# Patient Record
Sex: FEMALE | Race: WHITE | NOT HISPANIC OR LATINO
[De-identification: names, ages, dates, MRNs, and addresses within clinical notes are randomized per-mention and may not be internally consistent; named-entity substitution may affect disease eponyms.]

---

## 2019-05-07 PROBLEM — Z00.00 ENCOUNTER FOR PREVENTIVE HEALTH EXAMINATION: Status: ACTIVE | Noted: 2019-05-07

## 2019-07-22 ENCOUNTER — APPOINTMENT (OUTPATIENT)
Dept: OBGYN | Facility: CLINIC | Age: 74
End: 2019-07-22
Payer: MEDICARE

## 2019-07-22 VITALS
WEIGHT: 180 LBS | HEIGHT: 65 IN | DIASTOLIC BLOOD PRESSURE: 90 MMHG | BODY MASS INDEX: 29.99 KG/M2 | SYSTOLIC BLOOD PRESSURE: 150 MMHG

## 2019-07-22 DIAGNOSIS — Z80.3 FAMILY HISTORY OF MALIGNANT NEOPLASM OF BREAST: ICD-10-CM

## 2019-07-22 DIAGNOSIS — Z86.19 PERSONAL HISTORY OF OTHER INFECTIOUS AND PARASITIC DISEASES: ICD-10-CM

## 2019-07-22 DIAGNOSIS — Z87.898 PERSONAL HISTORY OF OTHER SPECIFIED CONDITIONS: ICD-10-CM

## 2019-07-22 PROCEDURE — 99214 OFFICE O/P EST MOD 30 MIN: CPT

## 2019-07-22 NOTE — PHYSICAL EXAM
[Awake] : awake [Alert] : alert [Acute Distress] : no acute distress [LAD] : no lymphadenopathy [Goiter] : no goiter [Thyroid Nodule] : no thyroid nodule [Mass] : no breast mass [Nipple Discharge] : no nipple discharge [Axillary LAD] : no axillary lymphadenopathy [Soft] : soft [Tender] : non tender [Distended] : not distended [H/Smegaly] : no hepatosplenomegaly [Oriented x3] : oriented to person, place, and time [Flat Affect] : affect not flat [Depressed Mood] : not depressed [Normal] : uterus [Atrophy] : atrophy [No Bleeding] : there was no active vaginal bleeding [Uterine Adnexae] : were not tender and not enlarged [FreeTextEntry5] : flush with end of vagina. band of scar tissue over cervix. No change from last exam.

## 2019-07-22 NOTE — CHIEF COMPLAINT
[Annual Visit] : annual visit [FreeTextEntry1] : 72 yo G 4 P 4004 with LMP 50s presents for annual from my prior office.

## 2019-07-23 ENCOUNTER — MOBILE ON CALL (OUTPATIENT)
Age: 74
End: 2019-07-23

## 2019-07-23 LAB — HPV HIGH+LOW RISK DNA PNL CVX: NOT DETECTED

## 2020-06-21 ENCOUNTER — EMERGENCY (EMERGENCY)
Facility: HOSPITAL | Age: 75
LOS: 0 days | Discharge: HOME | End: 2020-06-21
Attending: EMERGENCY MEDICINE
Payer: MEDICARE

## 2020-06-21 VITALS
TEMPERATURE: 98 F | SYSTOLIC BLOOD PRESSURE: 145 MMHG | RESPIRATION RATE: 18 BRPM | HEART RATE: 78 BPM | HEIGHT: 64 IN | WEIGHT: 169.98 LBS | OXYGEN SATURATION: 96 % | DIASTOLIC BLOOD PRESSURE: 86 MMHG

## 2020-06-21 DIAGNOSIS — R60.9 EDEMA, UNSPECIFIED: ICD-10-CM

## 2020-06-21 DIAGNOSIS — M79.89 OTHER SPECIFIED SOFT TISSUE DISORDERS: ICD-10-CM

## 2020-06-21 PROCEDURE — 93971 EXTREMITY STUDY: CPT | Mod: 26,LT

## 2020-06-21 PROCEDURE — 93971 EXTREMITY STUDY: CPT | Mod: LT

## 2020-06-21 PROCEDURE — 99284 EMERGENCY DEPT VISIT MOD MDM: CPT

## 2020-06-21 PROCEDURE — 99284 EMERGENCY DEPT VISIT MOD MDM: CPT | Mod: 25

## 2020-06-21 NOTE — ED STATDOCS - SKIN, MLM
skin normal color for race, warm, dry and intact. +LLE with mild swelling, no erythema. 2+ DP pulses

## 2020-06-21 NOTE — ED STATDOCS - PROGRESS NOTE DETAILS
75 y/o F with PMHx of Arthritis presenting to the ED c/o LLE swelling and redness. Patient reports that she has been having LLE numbness, tingling that she attributed to sciatica, but yesterday she started having increased redness, swelling to LLE,. Patient came to the ED to r/o phlebitis. No hx of DVT. Notes that she had fall x1 week ago. States that redness, swelling has improved since yesterday. Denies CP, SOB, cough, fever or chills.   Laura Freraro PA-C Will US r/o DVT.  MIld STS to LLE.  Laura Ferraro PA-C US neg for DVT.  Pt. to follow with PMD and have outpatient repeat US one week.  Return to ED if any worsening symptoms.  Pt. understand instructions provided to her.  Laura Ferraro PA-C

## 2020-06-21 NOTE — ED STATDOCS - CARE PROVIDER_API CALL
Demi Fernandez E  INTERNAL MEDICINE  [address] [place] [address]  Phone: [phone]  Fax: [phone]  Follow Up Time:

## 2020-06-21 NOTE — ED STATDOCS - PATIENT PORTAL LINK FT
You can access the FollowMyHealth Patient Portal offered by [facility] by registering at the following website: [email or web address]. By joining [facility]’s FollowMyHealth portal, you will also be able to view your health information using other applications (apps) compatible with our system.

## 2020-06-21 NOTE — ED ADULT TRIAGE NOTE - CHIEF COMPLAINT QUOTE
Pt comes to the ED for left lower extremity swelling and redness. Pt states that it has been going on for a while and she initially thought it was sciatica, but today it has gotten much worse and swelling and redness began.

## 2020-06-21 NOTE — ED STATDOCS - OBJECTIVE STATEMENT
75 y/o F with PMHx of Arthritis presenting to the ED c/o LLE swelling and redness. Patient reports that she has been having LLE numbness, tingling that she attributed to sciatica, but yesterday she started having increased redness, swelling to LLE,. Patient came to the ED to r/o phlebitis. No hx of DVT. Notes that she had fall x1 week ago. States that redness, swelling has improved since yesterday. Denies CP, SOB, cough, fever or chills.

## 2020-06-21 NOTE — ED STATDOCS - CLINICAL SUMMARY MEDICAL DECISION MAKING FREE TEXT BOX
Will evaluate for DVT vs phlebitis. No signs of cellulitis, dispo pending imaging, reassessment. Will evaluate for DVT vs phlebitis. No signs of cellulitis, dispo pending imaging, reassessment.    US neg for DVT.  Pt. to follow with PMD and have outpatient repeat US one week.  Return to ED if any worsening symptoms.  Pt. understand instructions provided to her.  Laura Ferraro PA-C Will evaluate for DVT vs phlebitis. No signs of cellulitis, dispo pending imaging, reassessment.  No significant edema to LLE.      US neg for DVT.  Pt. to follow with PMD and have outpatient repeat US one week.  Return to ED if any worsening symptoms.  Pt. understand instructions provided to her.  Laura Ferraro PA-C

## 2021-02-23 PROBLEM — Z78.9 OTHER SPECIFIED HEALTH STATUS: Chronic | Status: ACTIVE | Noted: 2020-06-21

## 2021-03-25 ENCOUNTER — APPOINTMENT (OUTPATIENT)
Dept: OBGYN | Facility: CLINIC | Age: 76
End: 2021-03-25
Payer: MEDICARE

## 2021-03-25 VITALS
HEIGHT: 64 IN | SYSTOLIC BLOOD PRESSURE: 114 MMHG | BODY MASS INDEX: 29.88 KG/M2 | WEIGHT: 175 LBS | DIASTOLIC BLOOD PRESSURE: 70 MMHG

## 2021-03-25 DIAGNOSIS — Z86.19 PERSONAL HISTORY OF OTHER INFECTIOUS AND PARASITIC DISEASES: ICD-10-CM

## 2021-03-25 DIAGNOSIS — Z87.39 PERSONAL HISTORY OF OTHER DISEASES OF THE MUSCULOSKELETAL SYSTEM AND CONNECTIVE TISSUE: ICD-10-CM

## 2021-03-25 PROCEDURE — G0101: CPT

## 2021-03-25 RX ORDER — DICLOFENAC SODIUM 75 MG/1
75 TABLET, DELAYED RELEASE ORAL
Refills: 0 | Status: ACTIVE | COMMUNITY

## 2021-03-25 NOTE — HISTORY OF PRESENT ILLNESS
[FreeTextEntry1] : 72 yo  with LMP early 50s for annual.\par \par LEEP about  with normal paps.  HPV also neg.\par \par  x 4 7-9#\par LISY requires pad daily but not bothered.\par \par Daughter with breast cancer, but negative BRCA gene.\par M Aunt in 60s [Mammogramdate] : 2020 [PapSmeardate] : 2019 [ColonoscopyDate] : 2020 [TextBox_43] : Aly Fernandez

## 2021-03-25 NOTE — DISCUSSION/SUMMARY
[FreeTextEntry1] : Health Maintenance:\par -Pap with HPV neg in 2019\par -Mammo Rx\par -TBSE\par -colonoscopy guidelines reviewed with patient\par -Achieve Vit D levels of 30-40, intake of 1100 mg daily calcium mostly thru dark green\par leafy greens and milk products, exercise 30 minutes TIW\par \par LEEP\par 2012\par repeat pap today

## 2021-03-29 ENCOUNTER — NON-APPOINTMENT (OUTPATIENT)
Age: 76
End: 2021-03-29

## 2021-04-12 ENCOUNTER — OUTPATIENT (OUTPATIENT)
Dept: OUTPATIENT SERVICES | Facility: HOSPITAL | Age: 76
LOS: 1 days | End: 2021-04-12
Payer: MEDICARE

## 2021-04-12 ENCOUNTER — APPOINTMENT (OUTPATIENT)
Dept: RADIOLOGY | Facility: CLINIC | Age: 76
End: 2021-04-12
Payer: MEDICARE

## 2021-04-12 ENCOUNTER — RESULT REVIEW (OUTPATIENT)
Age: 76
End: 2021-04-12

## 2021-04-12 ENCOUNTER — APPOINTMENT (OUTPATIENT)
Dept: MAMMOGRAPHY | Facility: CLINIC | Age: 76
End: 2021-04-12
Payer: MEDICARE

## 2021-04-12 ENCOUNTER — TRANSCRIPTION ENCOUNTER (OUTPATIENT)
Age: 76
End: 2021-04-12

## 2021-04-12 DIAGNOSIS — Z12.31 ENCOUNTER FOR SCREENING MAMMOGRAM FOR MALIGNANT NEOPLASM OF BREAST: ICD-10-CM

## 2021-04-12 PROCEDURE — 77067 SCR MAMMO BI INCL CAD: CPT | Mod: 26

## 2021-04-12 PROCEDURE — 77067 SCR MAMMO BI INCL CAD: CPT

## 2021-04-12 PROCEDURE — 77063 BREAST TOMOSYNTHESIS BI: CPT

## 2021-04-12 PROCEDURE — 77063 BREAST TOMOSYNTHESIS BI: CPT | Mod: 26

## 2021-09-14 ENCOUNTER — EMERGENCY (EMERGENCY)
Facility: HOSPITAL | Age: 76
LOS: 0 days | Discharge: HOME | End: 2021-09-14
Attending: STUDENT IN AN ORGANIZED HEALTH CARE EDUCATION/TRAINING PROGRAM
Payer: MEDICARE

## 2021-09-14 VITALS
HEIGHT: 64 IN | RESPIRATION RATE: 17 BRPM | WEIGHT: 160.06 LBS | SYSTOLIC BLOOD PRESSURE: 136 MMHG | TEMPERATURE: 98 F | DIASTOLIC BLOOD PRESSURE: 68 MMHG | HEART RATE: 79 BPM | OXYGEN SATURATION: 100 %

## 2021-09-14 VITALS
OXYGEN SATURATION: 100 % | SYSTOLIC BLOOD PRESSURE: 138 MMHG | HEART RATE: 71 BPM | DIASTOLIC BLOOD PRESSURE: 67 MMHG | RESPIRATION RATE: 20 BRPM

## 2021-09-14 DIAGNOSIS — R11.2 NAUSEA WITH VOMITING, UNSPECIFIED: ICD-10-CM

## 2021-09-14 DIAGNOSIS — K92.1 MELENA: ICD-10-CM

## 2021-09-14 DIAGNOSIS — K52.9 NONINFECTIVE GASTROENTERITIS AND COLITIS, UNSPECIFIED: ICD-10-CM

## 2021-09-14 DIAGNOSIS — R19.7 DIARRHEA, UNSPECIFIED: ICD-10-CM

## 2021-09-14 DIAGNOSIS — R10.9 UNSPECIFIED ABDOMINAL PAIN: ICD-10-CM

## 2021-09-14 LAB
ALBUMIN SERPL ELPH-MCNC: 3.6 G/DL — SIGNIFICANT CHANGE UP (ref 3.3–5)
ALP SERPL-CCNC: 80 U/L — SIGNIFICANT CHANGE UP (ref 40–120)
ALT FLD-CCNC: 23 U/L — SIGNIFICANT CHANGE UP (ref 12–78)
ANION GAP SERPL CALC-SCNC: 3 MMOL/L — LOW (ref 5–17)
APPEARANCE UR: CLEAR — SIGNIFICANT CHANGE UP
AST SERPL-CCNC: 32 U/L — SIGNIFICANT CHANGE UP (ref 15–37)
BASOPHILS # BLD AUTO: 0.04 K/UL — SIGNIFICANT CHANGE UP (ref 0–0.2)
BASOPHILS NFR BLD AUTO: 0.4 % — SIGNIFICANT CHANGE UP (ref 0–2)
BILIRUB SERPL-MCNC: 0.5 MG/DL — SIGNIFICANT CHANGE UP (ref 0.2–1.2)
BILIRUB UR-MCNC: NEGATIVE — SIGNIFICANT CHANGE UP
BUN SERPL-MCNC: 21 MG/DL — SIGNIFICANT CHANGE UP (ref 7–23)
CALCIUM SERPL-MCNC: 8.8 MG/DL — SIGNIFICANT CHANGE UP (ref 8.5–10.1)
CHLORIDE SERPL-SCNC: 108 MMOL/L — SIGNIFICANT CHANGE UP (ref 96–108)
CO2 SERPL-SCNC: 30 MMOL/L — SIGNIFICANT CHANGE UP (ref 22–31)
COLOR SPEC: YELLOW — SIGNIFICANT CHANGE UP
CREAT SERPL-MCNC: 0.94 MG/DL — SIGNIFICANT CHANGE UP (ref 0.5–1.3)
DIFF PNL FLD: ABNORMAL
EOSINOPHIL # BLD AUTO: 0.55 K/UL — HIGH (ref 0–0.5)
EOSINOPHIL NFR BLD AUTO: 5.4 % — SIGNIFICANT CHANGE UP (ref 0–6)
GLUCOSE SERPL-MCNC: 78 MG/DL — SIGNIFICANT CHANGE UP (ref 70–99)
GLUCOSE UR QL: NEGATIVE MG/DL — SIGNIFICANT CHANGE UP
HCT VFR BLD CALC: 45.8 % — HIGH (ref 34.5–45)
HGB BLD-MCNC: 14.4 G/DL — SIGNIFICANT CHANGE UP (ref 11.5–15.5)
IMM GRANULOCYTES NFR BLD AUTO: 0.6 % — SIGNIFICANT CHANGE UP (ref 0–1.5)
KETONES UR-MCNC: ABNORMAL
LEUKOCYTE ESTERASE UR-ACNC: ABNORMAL
LIDOCAIN IGE QN: 129 U/L — SIGNIFICANT CHANGE UP (ref 73–393)
LYMPHOCYTES # BLD AUTO: 19.8 % — SIGNIFICANT CHANGE UP (ref 13–44)
LYMPHOCYTES # BLD AUTO: 2.01 K/UL — SIGNIFICANT CHANGE UP (ref 1–3.3)
MCHC RBC-ENTMCNC: 28.6 PG — SIGNIFICANT CHANGE UP (ref 27–34)
MCHC RBC-ENTMCNC: 31.4 GM/DL — LOW (ref 32–36)
MCV RBC AUTO: 91.1 FL — SIGNIFICANT CHANGE UP (ref 80–100)
MONOCYTES # BLD AUTO: 0.63 K/UL — SIGNIFICANT CHANGE UP (ref 0–0.9)
MONOCYTES NFR BLD AUTO: 6.2 % — SIGNIFICANT CHANGE UP (ref 2–14)
NEUTROPHILS # BLD AUTO: 6.88 K/UL — SIGNIFICANT CHANGE UP (ref 1.8–7.4)
NEUTROPHILS NFR BLD AUTO: 67.6 % — SIGNIFICANT CHANGE UP (ref 43–77)
NITRITE UR-MCNC: NEGATIVE — SIGNIFICANT CHANGE UP
PH UR: 5 — SIGNIFICANT CHANGE UP (ref 5–8)
PLATELET # BLD AUTO: 321 K/UL — SIGNIFICANT CHANGE UP (ref 150–400)
POTASSIUM SERPL-MCNC: 4.9 MMOL/L — SIGNIFICANT CHANGE UP (ref 3.5–5.3)
POTASSIUM SERPL-SCNC: 4.9 MMOL/L — SIGNIFICANT CHANGE UP (ref 3.5–5.3)
PROT SERPL-MCNC: 7.3 GM/DL — SIGNIFICANT CHANGE UP (ref 6–8.3)
PROT UR-MCNC: 15 MG/DL
RBC # BLD: 5.03 M/UL — SIGNIFICANT CHANGE UP (ref 3.8–5.2)
RBC # FLD: 14.5 % — SIGNIFICANT CHANGE UP (ref 10.3–14.5)
SODIUM SERPL-SCNC: 141 MMOL/L — SIGNIFICANT CHANGE UP (ref 135–145)
SP GR SPEC: 1.02 — SIGNIFICANT CHANGE UP (ref 1.01–1.02)
UROBILINOGEN FLD QL: NEGATIVE MG/DL — SIGNIFICANT CHANGE UP
WBC # BLD: 10.17 K/UL — SIGNIFICANT CHANGE UP (ref 3.8–10.5)
WBC # FLD AUTO: 10.17 K/UL — SIGNIFICANT CHANGE UP (ref 3.8–10.5)

## 2021-09-14 PROCEDURE — 96360 HYDRATION IV INFUSION INIT: CPT | Mod: XU

## 2021-09-14 PROCEDURE — 74177 CT ABD & PELVIS W/CONTRAST: CPT

## 2021-09-14 PROCEDURE — 81001 URINALYSIS AUTO W/SCOPE: CPT

## 2021-09-14 PROCEDURE — 74177 CT ABD & PELVIS W/CONTRAST: CPT | Mod: 26,MG

## 2021-09-14 PROCEDURE — 36415 COLL VENOUS BLD VENIPUNCTURE: CPT

## 2021-09-14 PROCEDURE — 85025 COMPLETE CBC W/AUTO DIFF WBC: CPT

## 2021-09-14 PROCEDURE — 83690 ASSAY OF LIPASE: CPT

## 2021-09-14 PROCEDURE — U0005: CPT

## 2021-09-14 PROCEDURE — U0003: CPT

## 2021-09-14 PROCEDURE — 99284 EMERGENCY DEPT VISIT MOD MDM: CPT

## 2021-09-14 PROCEDURE — G1004: CPT

## 2021-09-14 PROCEDURE — 80053 COMPREHEN METABOLIC PANEL: CPT

## 2021-09-14 PROCEDURE — 99284 EMERGENCY DEPT VISIT MOD MDM: CPT | Mod: 25

## 2021-09-14 PROCEDURE — 87086 URINE CULTURE/COLONY COUNT: CPT

## 2021-09-14 RX ORDER — METRONIDAZOLE 500 MG
1 TABLET ORAL
Qty: 21 | Refills: 0
Start: 2021-09-14 | End: 2021-09-20

## 2021-09-14 RX ORDER — METRONIDAZOLE 500 MG
500 TABLET ORAL ONCE
Refills: 0 | Status: COMPLETED | OUTPATIENT
Start: 2021-09-14 | End: 2021-09-14

## 2021-09-14 RX ORDER — MOXIFLOXACIN HYDROCHLORIDE TABLETS, 400 MG 400 MG/1
1 TABLET, FILM COATED ORAL
Qty: 14 | Refills: 0
Start: 2021-09-14 | End: 2021-09-20

## 2021-09-14 RX ORDER — CIPROFLOXACIN LACTATE 400MG/40ML
500 VIAL (ML) INTRAVENOUS ONCE
Refills: 0 | Status: COMPLETED | OUTPATIENT
Start: 2021-09-14 | End: 2021-09-14

## 2021-09-14 RX ORDER — SODIUM CHLORIDE 9 MG/ML
1000 INJECTION INTRAMUSCULAR; INTRAVENOUS; SUBCUTANEOUS ONCE
Refills: 0 | Status: COMPLETED | OUTPATIENT
Start: 2021-09-14 | End: 2021-09-14

## 2021-09-14 RX ADMIN — Medication 500 MILLIGRAM(S): at 10:35

## 2021-09-14 RX ADMIN — SODIUM CHLORIDE 1000 MILLILITER(S): 9 INJECTION INTRAMUSCULAR; INTRAVENOUS; SUBCUTANEOUS at 07:49

## 2021-09-14 RX ADMIN — SODIUM CHLORIDE 1000 MILLILITER(S): 9 INJECTION INTRAMUSCULAR; INTRAVENOUS; SUBCUTANEOUS at 08:49

## 2021-09-14 NOTE — ED ADULT NURSE NOTE - DISCHARGE DATE/TIME
Normal vision: sees adequately in most situations; can see medication labels, newsprint 14-Sep-2021 10:43

## 2021-09-14 NOTE — ED ADULT TRIAGE NOTE - CHIEF COMPLAINT QUOTE
Pt presents to the ED c/o lower abdominal pain. Pt states pain started Saturday night with associated N/V/D. Reports "bloody mucus." Yesterday pain subsided however came back this morning and woke her up. No episodes of N/V/D today, but came to rule out an obstruction. Denies chest pain, SOB, dizziness, fevers, dysuria.

## 2021-09-14 NOTE — ED PROVIDER NOTE - PATIENT PORTAL LINK FT
You can access the FollowMyHealth Patient Portal offered by [facility] by registering at the following website: [email or web address]. By joining [facility]’s FollowMyHealth portal, you will also be able to view your health information using other applications (apps) compatible with our system.

## 2021-09-14 NOTE — ED PROVIDER NOTE - CLINICAL SUMMARY MEDICAL DECISION MAKING FREE TEXT BOX
Labs, CT, urine. 75 y/o female with abdominal pain. Get Labs, CT, urine. 75 y/o female with abdominal pain. labs, ct, urine

## 2021-09-14 NOTE — ED PROVIDER NOTE - OBJECTIVE STATEMENT
75 y/o female presents to the ED c/o abdominal pain. Pt states she went out to eat on Saturday, states she had went to the bakery and had some old [place] that was sitting in the refrigerator. Pt states she then developed diffuse vomiting, diarrhea and blood in her stools. States she has also bee having intermittent abdominal cramping since then, which worsened this morning, prompting her ED visit. Denies nausea or vomiting at this time. Denies fevers or chills. No Hx of abdominal surgeries. 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

## 2021-09-14 NOTE — ED PROVIDER NOTE - MDM PATIENT STATEMENT FOR ADDL TREATMENT
appears normal and intact
Patient with one or more new problems requiring additional work-up/treatment.

## 2021-09-14 NOTE — ED PROVIDER NOTE - PROGRESS NOTE DETAILS
Angie DO: ct with concern for colitis; antibiotics ordered; rx sent; given copy of all labs, urine, ct scan results and notified of all results- need for non emergent pelvic and ruq ultrasound (no right upper quadrant tenderness) as outpatient; strict return precautions given.

## 2021-09-14 NOTE — ED ADULT TRIAGE NOTE - BMI (KG/M2)
[FreeTextEntry1] : I, Lesa Mccall ATC, assisted with the history and documentation for Dr. Umana on this date 02/21/2020.\par  27.5

## 2021-09-14 NOTE — ED ADULT NURSE NOTE - CHIEF COMPLAINT QUOTE
Preventing Migraine Headaches: Triggers     Red wine is a common migraine trigger. The first step in preventing migraines is to learn what triggers them. You may then be able to control your triggers to avoid or reduce the severity of your migraines. · Change your behavior at times when triggers can't be avoided. For example, make sure to get enough rest and drink plenty of water while you're traveling. Make sure to carry a hat, sunglasses, and your medicines.  Be alert for migraine symptoms, so you can · Drink only clear liquids or eat a light diet until your symptoms get better. This will help you avoid nausea and vomiting. How to prevent migraines  Pay attention to what seems to trigger your headache. Try to avoid the triggers when you can.  If you hav Follow-up care  Follow up with your healthcare provider, or as advised. Talk with your provider if you have frequent headaches. He or she can figure out a treatment plan. Ask if you can have medicine to take at home the next time you get a bad headache.  Gaby Camacho Pt presents to the ED c/o lower abdominal pain. Pt states pain started Saturday night with associated N/V/D. Reports "bloody mucus." Yesterday pain subsided however came back this morning and woke her up. No episodes of N/V/D today, but came to rule out an obstruction. Denies chest pain, SOB, dizziness, fevers, dysuria.

## 2021-09-14 NOTE — ED ADULT NURSE NOTE - OBJECTIVE STATEMENT
Pt is a 76 yr old female who presents to the Ed with c/o abdominal pain that started on Saturday.  Assocated nausea and diarrhea. Denies fever, sick contacts

## 2021-09-14 NOTE — ED PROVIDER NOTE - NSFOLLOWUPINSTRUCTIONS_ED_ALL_ED_FT
Colitis     Colitis is inflammation of the colon. Colitis may last a short time (be acute), or it may last a long time (become chronic).  What are the causes?  This condition may be caused by:  Viruses.Bacteria.Reaction to medicine.Certain autoimmune diseases such as Crohn's disease or ulcerative colitis.Radiation treatment.Decreased blood flow to the bowel (ischemia).What are the signs or symptoms?  Symptoms of this condition include:  Watery diarrhea.Passing bloody or tarry stool.Pain.Fever.Vomiting.Tiredness (fatigue).Weight loss.Bloating.Abdominal pain.Having fewer bowel movements than usual.A strong and sudden urge to have a bowel movement.Feeling like the bowel is not empty after a bowel movement.How is this diagnosed?  This condition is diagnosed with a stool test or a blood test.  You may also have other tests, such as:  X-rays.CT scan.Colonoscopy.Endoscopy.Biopsy.How is this treated?  Treatment for this condition depends on the cause. The condition may be treated by:  Resting the bowel. This involves not eating or drinking for a period of time.Fluids that are given through an IV.Medicine for pain and diarrhea.Antibiotic medicines.Cortisone medicines.Surgery.Follow these instructions at home:  Eating and drinking        Follow instructions from your health care provider about eating or drinking restrictions.Drink enough fluid to keep your urine pale yellow.Work with a dietitian to determine which foods cause your condition to flare up.Avoid foods that cause flare-ups.Eat a well-balanced diet.General instructions     If you were prescribed an antibiotic medicine, take it as told by your health care provider. Do not stop taking the antibiotic even if you start to feel better.Take over-the-counter and prescription medicines only as told by your health care provider.Keep all follow-up visits as told by your health care provider. This is important.Contact a health care provider if:  Your symptoms do not go away.You develop new symptoms.Get help right away if you:  Have a fever that does not go away with treatment.Develop chills.Have extreme weakness, fainting, or dehydration.Have repeated vomiting.Develop severe pain in your abdomen.Pass bloody or tarry stool.Summary  Colitis is inflammation of the colon. Colitis may last a short time (be acute), or it may last a long time (become chronic).Treatment for this condition depends on the cause and may include resting the bowel, taking medicines, or having surgery.If you were prescribed an antibiotic medicine, take it as told by your health care provider. Do not stop taking the antibiotic even if you start to feel better.Get help right away if you develop severe pain in your abdomen.Keep all follow-up visits as told by your health care provider. This is important.This information is not intended to replace advice given to you by your health care provider. Make sure you discuss any questions you have with your health care provider.

## 2021-09-14 NOTE — ED ADULT NURSE NOTE - NSIMPLEMENTINTERV_GEN_ALL_ED
Implemented All Universal Safety Interventions:  [place] to call system. Call bell, personal items and telephone within reach. Instruct patient to call for assistance. Room bathroom lighting operational. Non-slip footwear when patient is off stretcher. Physically safe environment: no spills, clutter or unnecessary equipment. Stretcher in lowest position, wheels locked, appropriate side rails in place.

## 2021-09-14 NOTE — ED PROVIDER NOTE - CARE PROVIDER_API CALL
Giovanni Perez)  Gastroenterology; Internal Medicine  [address] [place] [address]  Phone: [phone]  Fax: [phone]  Follow Up Time:

## 2021-09-15 LAB
CULTURE RESULTS: SIGNIFICANT CHANGE UP
SPECIMEN SOURCE: SIGNIFICANT CHANGE UP

## 2021-09-20 DIAGNOSIS — N95.0 POSTMENOPAUSAL BLEEDING: ICD-10-CM

## 2021-09-21 ENCOUNTER — APPOINTMENT (OUTPATIENT)
Dept: OBGYN | Facility: CLINIC | Age: 76
End: 2021-09-21

## 2021-09-23 ENCOUNTER — APPOINTMENT (OUTPATIENT)
Dept: OBGYN | Facility: CLINIC | Age: 76
End: 2021-09-23
Payer: MEDICARE

## 2021-09-23 DIAGNOSIS — R93.89 ABNORMAL FINDINGS ON DIAGNOSTIC IMAGING OF OTHER SPECIFIED BODY STRUCTURES: ICD-10-CM

## 2021-09-23 PROCEDURE — 99214 OFFICE O/P EST MOD 30 MIN: CPT

## 2021-09-24 PROBLEM — R93.89 ABNORMAL PELVIC ULTRASOUND: Status: ACTIVE | Noted: 2021-09-24

## 2021-09-24 NOTE — PHYSICAL EXAM
[Vulvar Atrophy] : vulvar atrophy [Atrophy] : atrophy [Cervical Stenosis] : cervical stenosis [FreeTextEntry5] : Flush with vaginal, but os is visible and clear. No parametrial thickening. [FreeTextEntry6] : AV uterus [FreeTextEntry9] : smooth recto-vaginal septum, no masses.  Guiac negative.

## 2021-09-24 NOTE — DISCUSSION/SUMMARY
[FreeTextEntry1] : Findings of CT scan 9/14 and US pelvis [facility] 9/21 reviewed with patient.\par TVUS today reveiwed. Small fluid in endometrial cavity allow evaluation of endometrium in detail.\par 2-3 mm of endometrial lining on each side was measured. Marked calcification of small vessels in wall\par of uterus noted especially anteriorly possibly representing both old adenomyosis and atherosclerosus, the same way that mammography can.\par \par Evaluation of the endocervical region was more limited due to AVAF position of uterus. On VE, the cervix is flush with vagina.  In order to evaluate the endometrium (which I did in 2016 along with LEEP) I would need to take Jodee to OR and use Cytotec. The r/b/a of such plan was discussed.\par \par I am comfortable with following with US Pelvis in 3-6 months as this evaluation is prompted only by an incidental CT finding. I am aware of the partially stenotic cervix and the healing of LEEP which limits the pap test, but for that evaluation, I will repeat HPV at time of follow up visit.\par \par If any vaginal bleeding follows, pt is to call ASAP.

## 2021-09-24 NOTE — HISTORY OF PRESENT ILLNESS
[FreeTextEntry1] : 77 yo  LMP early 50s with LEEP procedure for CHRISTIANO 2 presents with abnormal CT finding.\par \par Bacterial induced colitis led to HH ER visit. CT scan mid September showed "mildly thickened endometrial lining"\par No bleeding nor discharge, but recall that cervix is now flush with vagina. Pap 3/25/21 was negative but devoid of endocervical nor transformation zone cells.\par \par Dr Perez treated colitis with Cipro / Flagyl with resolution. \par \par  x 4 (7-9#)\par LISY requires pad daily but no changes since March\par \par Daughter with breast cancer, but BRCA neg\par M Aunt breast cancer in 60s\par \par SH: \par  newly dx aggressive prostate cancer, now stage 4\par \par \par \par \par

## 2021-09-24 NOTE — REVIEW OF SYSTEMS
[Abdominal Pain] : no abdominal pain [Constipation] : no constipation [Diarrhea] : diarrhea [Vomiting] : no vomiting [Urgency] : no urgency [Frequency] : no frequency [Incontinence] : incontinence [Dysuria] : no dysuria [Abn Vaginal bleeding] : no abnormal vaginal bleeding [Pelvic pain] : no pelvic pain [Genital Rash/Irritation] : no genital rash/irritation [Depression] : no depression [Sleep Disturbances] : no sleep disturbances [Negative] : Cardiovascular [de-identified] : Expresses distress over  and daughter's illnesses

## 2022-04-13 ENCOUNTER — APPOINTMENT (OUTPATIENT)
Dept: RADIOLOGY | Facility: CLINIC | Age: 77
End: 2022-04-13
Payer: MEDICARE

## 2022-04-13 ENCOUNTER — OUTPATIENT (OUTPATIENT)
Dept: OUTPATIENT SERVICES | Facility: HOSPITAL | Age: 77
LOS: 1 days | End: 2022-04-13
Payer: MEDICARE

## 2022-04-13 ENCOUNTER — APPOINTMENT (OUTPATIENT)
Dept: MAMMOGRAPHY | Facility: CLINIC | Age: 77
End: 2022-04-13
Payer: MEDICARE

## 2022-04-13 DIAGNOSIS — Z91.89 OTHER SPECIFIED PERSONAL RISK FACTORS, NOT ELSEWHERE CLASSIFIED: ICD-10-CM

## 2022-04-13 DIAGNOSIS — Z00.8 ENCOUNTER FOR OTHER GENERAL EXAMINATION: ICD-10-CM

## 2022-04-13 DIAGNOSIS — Z12.31 ENCOUNTER FOR SCREENING MAMMOGRAM FOR MALIGNANT NEOPLASM OF BREAST: ICD-10-CM

## 2022-04-13 PROCEDURE — 77067 SCR MAMMO BI INCL CAD: CPT | Mod: 26

## 2022-04-13 PROCEDURE — 77080 DXA BONE DENSITY AXIAL: CPT | Mod: 26

## 2022-04-13 PROCEDURE — 77080 DXA BONE DENSITY AXIAL: CPT

## 2022-04-13 PROCEDURE — 77063 BREAST TOMOSYNTHESIS BI: CPT

## 2022-04-13 PROCEDURE — 77063 BREAST TOMOSYNTHESIS BI: CPT | Mod: 26

## 2022-04-13 PROCEDURE — 77067 SCR MAMMO BI INCL CAD: CPT

## 2022-04-21 DIAGNOSIS — Z80.3 FAMILY HISTORY OF MALIGNANT NEOPLASM OF BREAST: ICD-10-CM

## 2022-04-21 DIAGNOSIS — Z12.31 ENCOUNTER FOR SCREENING MAMMOGRAM FOR MALIGNANT NEOPLASM OF BREAST: ICD-10-CM

## 2022-04-21 DIAGNOSIS — R92.8 OTHER ABNORMAL AND INCONCLUSIVE FINDINGS ON DIAGNOSTIC IMAGING OF BREAST: ICD-10-CM

## 2022-04-21 DIAGNOSIS — Z13.820 ENCOUNTER FOR SCREENING FOR OSTEOPOROSIS: ICD-10-CM

## 2023-05-12 ENCOUNTER — APPOINTMENT (OUTPATIENT)
Dept: OBGYN | Facility: CLINIC | Age: 78
End: 2023-05-12
Payer: MEDICARE

## 2023-05-12 VITALS — DIASTOLIC BLOOD PRESSURE: 85 MMHG | SYSTOLIC BLOOD PRESSURE: 122 MMHG | HEIGHT: 64 IN

## 2023-05-12 DIAGNOSIS — Z01.419 ENCOUNTER FOR GYNECOLOGICAL EXAMINATION (GENERAL) (ROUTINE) W/OUT ABNORMAL FINDINGS: ICD-10-CM

## 2023-05-12 DIAGNOSIS — Z12.31 ENCOUNTER FOR SCREENING MAMMOGRAM FOR MALIGNANT NEOPLASM OF BREAST: ICD-10-CM

## 2023-05-12 DIAGNOSIS — B97.7 PAPILLOMAVIRUS AS THE CAUSE OF DISEASES CLASSIFIED ELSEWHERE: ICD-10-CM

## 2023-05-12 DIAGNOSIS — Z12.4 ENCOUNTER FOR SCREENING FOR MALIGNANT NEOPLASM OF CERVIX: ICD-10-CM

## 2023-05-12 PROCEDURE — 99214 OFFICE O/P EST MOD 30 MIN: CPT

## 2023-05-12 NOTE — DISCUSSION/SUMMARY
[FreeTextEntry1] : Health Maintenance:\par -Pap with HPV, prior '21 neg\par -Mammo Rx\par -TBSE\par -colonoscopy guidelines reviewed with patient\par -Achieve Vit D levels of 30-40, intake of 1100 mg daily calcium mostly thru dark green\par leafy greens and milk products, exercise 30 minutes TIW\par \par RTO 1 0r 2 yrs\par DR Demi Fernandez internist will do Mammo\par

## 2023-05-12 NOTE — HISTORY OF PRESENT ILLNESS
[FreeTextEntry1] : 76 yo  LMP early 50s with LEEP procedure for CHRISTIANO 2 presents with abnormal CT finding.\par \par Bacterial induced colitis led to HH ER visit. CT scan mid September showed "mildly thickened endometrial lining"\par No bleeding nor discharge, but recall that cervix is now flush with vagina. \par Pap 3/25/21 was negative but devoid of endocervical nor transformation zone cells.\par \par  Dr Perez treated colitis with Cipro / Flagyl with resolution. \par \par  x 4 (7-9#)\par Ronit Margaret Brunner\par LISY requires pad daily but no changes since March\par \par Daughter with breast cancer, but BRCA neg\par M Aunt breast cancer in 60s\par \par SH: \par  newly dx aggressive prostate cancer, now stage 4, stable now\par \par \par \par \par \par \par

## 2023-05-12 NOTE — PHYSICAL EXAM
[Appropriately responsive] : appropriately responsive [Alert] : alert [No Acute Distress] : no acute distress [No Lymphadenopathy] : no lymphadenopathy [Soft] : soft [Non-tender] : non-tender [Non-distended] : non-distended [No HSM] : No HSM [No Lesions] : no lesions [No Mass] : no mass [Oriented x3] : oriented x3 [Examination Of The Breasts] : a normal appearance [No Masses] : no breast masses were palpable [Labia Majora] : normal [Labia Minora] : normal [Normal] : normal [Uterine Adnexae] : normal [FreeTextEntry9] : Ángela neg

## 2023-05-23 ENCOUNTER — OUTPATIENT (OUTPATIENT)
Dept: OUTPATIENT SERVICES | Facility: HOSPITAL | Age: 78
LOS: 1 days | End: 2023-05-23
Payer: MEDICARE

## 2023-05-23 ENCOUNTER — APPOINTMENT (OUTPATIENT)
Dept: MAMMOGRAPHY | Facility: CLINIC | Age: 78
End: 2023-05-23
Payer: MEDICARE

## 2023-05-23 ENCOUNTER — RESULT REVIEW (OUTPATIENT)
Age: 78
End: 2023-05-23

## 2023-05-23 DIAGNOSIS — Z12.31 ENCOUNTER FOR SCREENING MAMMOGRAM FOR MALIGNANT NEOPLASM OF BREAST: ICD-10-CM

## 2023-05-23 PROCEDURE — 77067 SCR MAMMO BI INCL CAD: CPT | Mod: 26

## 2023-05-23 PROCEDURE — 77063 BREAST TOMOSYNTHESIS BI: CPT | Mod: 26

## 2023-05-23 PROCEDURE — 77067 SCR MAMMO BI INCL CAD: CPT

## 2023-05-23 PROCEDURE — 77063 BREAST TOMOSYNTHESIS BI: CPT

## 2023-05-24 LAB
CYTOLOGY CVX/VAG DOC THIN PREP: NORMAL
HPV HIGH+LOW RISK DNA PNL CVX: NOT DETECTED

## 2023-09-20 ENCOUNTER — APPOINTMENT (OUTPATIENT)
Dept: GASTROENTEROLOGY | Facility: CLINIC | Age: 78
End: 2023-09-20
Payer: MEDICARE

## 2023-09-20 VITALS
OXYGEN SATURATION: 97 % | WEIGHT: 160 LBS | HEART RATE: 80 BPM | HEIGHT: 64 IN | DIASTOLIC BLOOD PRESSURE: 78 MMHG | SYSTOLIC BLOOD PRESSURE: 132 MMHG | BODY MASS INDEX: 27.31 KG/M2

## 2023-09-20 DIAGNOSIS — Z12.11 ENCOUNTER FOR SCREENING FOR MALIGNANT NEOPLASM OF COLON: ICD-10-CM

## 2023-09-20 PROCEDURE — 99204 OFFICE O/P NEW MOD 45 MIN: CPT

## 2024-05-29 ENCOUNTER — OUTPATIENT (OUTPATIENT)
Dept: OUTPATIENT SERVICES | Facility: HOSPITAL | Age: 79
LOS: 1 days | End: 2024-05-29
Payer: MEDICARE

## 2024-05-29 ENCOUNTER — APPOINTMENT (OUTPATIENT)
Dept: MAMMOGRAPHY | Facility: CLINIC | Age: 79
End: 2024-05-29
Payer: MEDICARE

## 2024-05-29 DIAGNOSIS — Z00.8 ENCOUNTER FOR OTHER GENERAL EXAMINATION: ICD-10-CM

## 2024-05-29 PROCEDURE — 77067 SCR MAMMO BI INCL CAD: CPT

## 2024-05-29 PROCEDURE — 77063 BREAST TOMOSYNTHESIS BI: CPT

## 2024-05-29 PROCEDURE — 77063 BREAST TOMOSYNTHESIS BI: CPT | Mod: 26

## 2024-05-29 PROCEDURE — 77067 SCR MAMMO BI INCL CAD: CPT | Mod: 26

## 2025-02-28 NOTE — ED ADULT NURSE NOTE - HOW MANY DRINKS CONTAINING ALCOHOL DO YOU HAVE ON A TYPICAL DAY WHEN YOU ARE DRINKING?
Letter created patient contacted and informed. She will have mother pick it up. Letter taking to the front South entrance.  
Mother picked up: letter.   Identity was verified: Yes.   
Patient needs to have a work excuse. She has been sick since 2/26/25 vomiting/not feeling well.  Patients daughter was previously sick last week.  She was wondering if she could do a virtual visit today with Dr Robbins if she needs to be seen.    If provider could do an excuse letter saying she was off from 2/26/25-2/28/25 and returning 3/3/25.      Please contact patient to advise if provider could do a virtual visit with her today.  
1 or 2